# Patient Record
(demographics unavailable — no encounter records)

---

## 2025-04-22 NOTE — ASSESSMENT
[FreeTextEntry1] : 53 year old male with chronic headaches here to establish care. past 2 months with increased headaches s/p hitting head on the metal piece  no loc. neuro exam nonfocal. with his chronic headaches and age will obtain Brain MRI r/o structural changes  further eval based on above findings

## 2025-04-22 NOTE — REASON FOR VISIT
[Initial Evaluation] : an initial evaluation [Patient Declined  Services] : - None: Patient declined  services [Interpreters_Relationshiptopatient] : Spouse [TWNoteComboBox1] : [place]

## 2025-04-22 NOTE — HISTORY OF PRESENT ILLNESS
[FreeTextEntry1] : 53 year  old male   presents today for evaluation of headaches.   He has been having HAs for the past 3 years. 2 months ago  hit head on metal, no loc   Location: occipital   described as numbness, pressure  Timing of headache: varies Associated symptoms: nausea/vomiting  Pertinent negatives: dizziness, anosmia, blurred vision, visual aura, scotoma, memory impairment, neck stiffness,   runny nose or eyelid ptosis., neck pain  Aggravated Factors: none  Severity:  4/10 Occurs; 2x/week  Duration:  seconds      Sleeps: fragmented, snores, no daytime  Hydration: water:none   caffeine: 1cup Triggers: unknown Exercise: some